# Patient Record
Sex: MALE | NOT HISPANIC OR LATINO | URBAN - METROPOLITAN AREA
[De-identification: names, ages, dates, MRNs, and addresses within clinical notes are randomized per-mention and may not be internally consistent; named-entity substitution may affect disease eponyms.]

---

## 2020-06-11 ENCOUNTER — OFFICE VISIT (OUTPATIENT)
Dept: URBAN - METROPOLITAN AREA TELEHEALTH 2 | Facility: TELEHEALTH | Age: 75
End: 2020-06-11

## 2020-06-22 ENCOUNTER — OFFICE VISIT (OUTPATIENT)
Dept: URBAN - METROPOLITAN AREA CLINIC 63 | Facility: CLINIC | Age: 75
End: 2020-06-22

## 2020-07-07 LAB
% SATURATION: (no result)
ABSOLUTE BASOPHILS: (no result)
ABSOLUTE EOSINOPHILS: (no result)
ABSOLUTE LYMPHOCYTES: (no result)
ABSOLUTE MONOCYTES: (no result)
ABSOLUTE NEUTROPHILS: (no result)
ALBUMIN/GLOBULIN RATIO: (no result)
ALBUMIN: (no result)
ALKALINE PHOSPHATASE: (no result)
ALT: (no result)
AST: (no result)
BASOPHILS: (no result)
BILIRUBIN, TOTAL: (no result)
BUN/CREATININE RATIO: (no result)
CALCIUM: (no result)
CARBON DIOXIDE: (no result)
CHLORIDE: (no result)
CREATININE: (no result)
EGFR AFRICAN AMERICA: (no result)
EGFR NON-AFR. AMERICAN: (no result)
EOSINOPHILS: (no result)
FERRITIN: (no result)
FOLATE, SERUM: (no result)
GLOBULIN: (no result)
GLUCOSE: (no result)
HEMATOCRIT: (no result)
HEMOGLOBIN: (no result)
IRON BINDING CAPACITY: (no result)
IRON, TOTAL: (no result)
LYMPHOCYTES: (no result)
MCH: (no result)
MCHC: (no result)
MCV: (no result)
MONOCYTES: (no result)
MPV: (no result)
NEUTROPHILS: (no result)
PLATELET COUNT: (no result)
POTASSIUM: (no result)
PROTEIN, TOTAL: (no result)
RDW: (no result)
RED BLOOD CELL COUNT: (no result)
SODIUM: (no result)
UREA NITROGEN (BUN): (no result)
VITAMIN B12: (no result)
WHITE BLOOD CELL COUNT: (no result)

## 2020-08-14 ENCOUNTER — OFFICE VISIT (OUTPATIENT)
Dept: URBAN - METROPOLITAN AREA SURGERY CENTER 4 | Facility: SURGERY CENTER | Age: 75
End: 2020-08-14

## 2020-08-25 ENCOUNTER — OFFICE VISIT (OUTPATIENT)
Dept: URBAN - METROPOLITAN AREA SURGERY CENTER 4 | Facility: SURGERY CENTER | Age: 75
End: 2020-08-25

## 2020-08-28 ENCOUNTER — OFFICE VISIT (OUTPATIENT)
Dept: URBAN - METROPOLITAN AREA CLINIC 63 | Facility: CLINIC | Age: 75
End: 2020-08-28

## 2020-10-02 ENCOUNTER — OFFICE VISIT (OUTPATIENT)
Dept: URBAN - METROPOLITAN AREA SURGERY CENTER 4 | Facility: SURGERY CENTER | Age: 75
End: 2020-10-02

## 2020-10-06 LAB — PATHOLOGY (INDENTED REPORT): (no result)

## 2022-03-30 ENCOUNTER — OFFICE VISIT (OUTPATIENT)
Dept: URBAN - METROPOLITAN AREA CLINIC 63 | Facility: CLINIC | Age: 77
End: 2022-03-30

## 2022-04-14 ENCOUNTER — OFFICE VISIT (OUTPATIENT)
Dept: URBAN - METROPOLITAN AREA CLINIC 63 | Facility: CLINIC | Age: 77
End: 2022-04-14

## 2022-05-16 ENCOUNTER — OFFICE VISIT (OUTPATIENT)
Dept: URBAN - METROPOLITAN AREA MEDICAL CENTER 3 | Facility: MEDICAL CENTER | Age: 77
End: 2022-05-16

## 2022-06-10 ENCOUNTER — OFFICE VISIT (OUTPATIENT)
Dept: URBAN - METROPOLITAN AREA SURGERY CENTER 4 | Facility: SURGERY CENTER | Age: 77
End: 2022-06-10

## 2022-07-09 ENCOUNTER — TELEPHONE ENCOUNTER (OUTPATIENT)
Dept: URBAN - METROPOLITAN AREA CLINIC 121 | Facility: CLINIC | Age: 77
End: 2022-07-09

## 2022-07-09 RX ORDER — SIMVASTATIN 20 MG/1
TABLET, FILM COATED ORAL
Refills: 0 | OUTPATIENT
Start: 2018-11-21 | End: 2019-07-03

## 2022-07-09 RX ORDER — CALCIUM ACETATE 667 MG
TABLET ORAL
Refills: 0 | OUTPATIENT
Start: 2022-03-21 | End: 2022-04-14

## 2022-07-09 RX ORDER — ONDANSETRON HYDROCHLORIDE 4 MG/2ML
1 EVERY 4 - 6 HOURS AS NEEDED INJECTION, SOLUTION INTRAMUSCULAR; INTRAVENOUS
Refills: 0 | OUTPATIENT
Start: 2019-03-07 | End: 2020-06-22

## 2022-07-09 RX ORDER — GLIPIZIDE 10 MG/1
TABLET ORAL TWICE A DAY
Refills: 0 | OUTPATIENT
Start: 2018-11-21 | End: 2019-07-03

## 2022-07-09 RX ORDER — SITAGLIPTIN PHOSPHATE 100 MG
TABLET ORAL
Refills: 0 | OUTPATIENT
Start: 2018-10-18 | End: 2019-07-03

## 2022-07-09 RX ORDER — SIMVASTATIN 20 MG/1
TABLET, FILM COATED ORAL
Refills: 0 | OUTPATIENT
Start: 2018-10-18 | End: 2018-11-21

## 2022-07-09 RX ORDER — METOPROLOL SUCCINATE 25 MG/1
TABLET, EXTENDED RELEASE ORAL TWICE A DAY
Refills: 0 | OUTPATIENT
Start: 2020-01-08 | End: 2022-03-30

## 2022-07-09 RX ORDER — LOSARTAN POTASSIUM 100 MG/1
TABLET, FILM COATED ORAL
Refills: 0 | OUTPATIENT
Start: 2019-07-03 | End: 2020-01-08

## 2022-07-09 RX ORDER — LOSARTAN POTASSIUM 100 MG/1
TABLET, FILM COATED ORAL
Refills: 0 | OUTPATIENT
Start: 2018-11-21 | End: 2019-07-03

## 2022-07-09 RX ORDER — METOPROLOL TARTRATE 100 MG/1
TABLET, FILM COATED ORAL
Refills: 0 | OUTPATIENT
Start: 2022-01-25 | End: 2022-04-14

## 2022-07-09 RX ORDER — GLIPIZIDE 10 MG/1
TABLET ORAL TWICE A DAY
Refills: 0 | OUTPATIENT
Start: 2018-09-13 | End: 2018-10-18

## 2022-07-09 RX ORDER — INSULIN DETEMIR 100 [IU]/ML
INJECTION, SOLUTION SUBCUTANEOUS
Refills: 0 | OUTPATIENT
Start: 2022-01-10 | End: 2022-04-14

## 2022-07-09 RX ORDER — METOPROLOL SUCCINATE 25 MG/1
TABLET, EXTENDED RELEASE ORAL TWICE A DAY
Refills: 0 | OUTPATIENT
Start: 2018-09-13 | End: 2018-10-18

## 2022-07-09 RX ORDER — OMEPRAZOLE 20 MG/1
CAPSULE, DELAYED RELEASE ORAL
Refills: 0 | OUTPATIENT
Start: 2022-01-30 | End: 2022-04-14

## 2022-07-09 RX ORDER — FAMOTIDINE 10 MG
TWICE A DAY TABLET ORAL TWICE A DAY
Refills: 1 | OUTPATIENT
Start: 2019-03-07 | End: 2020-06-22

## 2022-07-09 RX ORDER — SITAGLIPTIN PHOSPHATE 100 MG
TABLET ORAL
Refills: 0 | OUTPATIENT
Start: 2019-07-03 | End: 2020-01-08

## 2022-07-09 RX ORDER — METOPROLOL SUCCINATE 25 MG/1
TABLET, EXTENDED RELEASE ORAL TWICE A DAY
Refills: 0 | OUTPATIENT
Start: 2018-11-21 | End: 2019-07-03

## 2022-07-09 RX ORDER — OMEPRAZOLE 20 MG/1
ONCE A DAY; ONE CAPSULE 30MIN BEFORE BREAKFAST CAPSULE, DELAYED RELEASE ORAL ONCE A DAY
Refills: 1 | OUTPATIENT
Start: 2020-10-02 | End: 2021-04-29

## 2022-07-09 RX ORDER — GLIPIZIDE 10 MG/1
TABLET ORAL TWICE A DAY
Refills: 0 | OUTPATIENT
Start: 2019-07-03 | End: 2020-01-08

## 2022-07-09 RX ORDER — METOPROLOL SUCCINATE 25 MG/1
TABLET, EXTENDED RELEASE ORAL TWICE A DAY
Refills: 0 | OUTPATIENT
Start: 2019-07-03 | End: 2020-01-08

## 2022-07-09 RX ORDER — SIMVASTATIN 20 MG/1
TABLET, FILM COATED ORAL
Refills: 0 | OUTPATIENT
Start: 2018-09-13 | End: 2018-10-18

## 2022-07-09 RX ORDER — LOSARTAN POTASSIUM 100 MG/1
TABLET, FILM COATED ORAL
Refills: 0 | OUTPATIENT
Start: 2018-09-13 | End: 2018-10-18

## 2022-07-09 RX ORDER — PREDNISONE 10 MG/1
TABLET ORAL
Refills: 0 | OUTPATIENT
Start: 2022-02-21 | End: 2022-04-14

## 2022-07-09 RX ORDER — GLIPIZIDE 10 MG/1
TABLET ORAL TWICE A DAY
Refills: 0 | OUTPATIENT
Start: 2018-10-18 | End: 2018-11-21

## 2022-07-09 RX ORDER — SIMVASTATIN 20 MG/1
TABLET, FILM COATED ORAL
Refills: 0 | OUTPATIENT
Start: 2019-07-03 | End: 2020-01-08

## 2022-07-09 RX ORDER — ONDANSETRON HYDROCHLORIDE 4 MG/2ML
USE AS DIRECTED. TAKE 1 TABLET WITH START OF PREP AND 1 TABLET 4 HOURS LATER INJECTION, SOLUTION INTRAMUSCULAR; INTRAVENOUS
Refills: 0 | OUTPATIENT
Start: 2020-01-08 | End: 2020-06-22

## 2022-07-09 RX ORDER — CALCIUM ACETATE 667 MG/1
TABLET ORAL
Refills: 0 | OUTPATIENT
Start: 2021-09-09 | End: 2022-03-30

## 2022-07-09 RX ORDER — ONDANSETRON HYDROCHLORIDE 4 MG/2ML
1 EVERY 4 - 6 HOURS AS NEEDED; 1 PILL BEFORE PREP AND ONE 4 HRS LATER INJECTION, SOLUTION INTRAMUSCULAR; INTRAVENOUS
Refills: 0 | OUTPATIENT
Start: 2019-04-05 | End: 2020-06-22

## 2022-07-09 RX ORDER — LOSARTAN POTASSIUM 100 MG/1
TABLET, FILM COATED ORAL
Refills: 0 | OUTPATIENT
Start: 2018-10-18 | End: 2018-11-21

## 2022-07-09 RX ORDER — METOPROLOL SUCCINATE 25 MG/1
TABLET, EXTENDED RELEASE ORAL TWICE A DAY
Refills: 0 | OUTPATIENT
Start: 2018-10-18 | End: 2018-11-21

## 2022-07-09 RX ORDER — FAMOTIDINE 10 MG
TWICE A DAY TABLET ORAL TWICE A DAY
Refills: 1 | OUTPATIENT
Start: 2018-11-21 | End: 2020-06-22

## 2022-07-09 RX ORDER — SITAGLIPTIN PHOSPHATE 100 MG
TABLET ORAL
Refills: 0 | OUTPATIENT
Start: 2018-09-13 | End: 2018-10-18

## 2022-07-10 ENCOUNTER — TELEPHONE ENCOUNTER (OUTPATIENT)
Dept: URBAN - METROPOLITAN AREA CLINIC 121 | Facility: CLINIC | Age: 77
End: 2022-07-10

## 2022-07-10 RX ORDER — OMEPRAZOLE 20 MG/1
CAPSULE, DELAYED RELEASE ORAL ONCE A DAY
Refills: 0 | Status: ACTIVE | COMMUNITY
Start: 2022-04-14

## 2022-07-10 RX ORDER — LOSARTAN POTASSIUM 100 MG/1
TABLET, FILM COATED ORAL
Refills: 0 | Status: ACTIVE | COMMUNITY
Start: 2020-01-08

## 2022-07-10 RX ORDER — SIMVASTATIN 20 MG/1
TABLET, FILM COATED ORAL
Refills: 0 | Status: ACTIVE | COMMUNITY
Start: 2020-01-08

## 2022-07-10 RX ORDER — CALCIUM ACETATE 667 MG
TABLET ORAL ONCE A DAY
Refills: 0 | Status: ACTIVE | COMMUNITY
Start: 2022-04-14

## 2022-07-10 RX ORDER — PREDNISONE 10 MG/1
TABLET ORAL ONCE A DAY
Refills: 0 | Status: ACTIVE | COMMUNITY
Start: 2022-04-14

## 2022-07-10 RX ORDER — OMEPRAZOLE 20 MG/1
TAKE 1 CAPSULE BY MOUTH ONCE DAILY 30  MINUTES  BEFORE  BREAKFAST CAPSULE, DELAYED RELEASE ORAL
Refills: 0 | Status: ACTIVE | COMMUNITY
Start: 2021-04-29

## 2022-07-10 RX ORDER — METOPROLOL TARTRATE 100 MG/1
TABLET, FILM COATED ORAL ONCE A DAY
Refills: 0 | Status: ACTIVE | COMMUNITY
Start: 2022-04-14

## 2022-07-10 RX ORDER — POLYETHYLENE GLYCOL-3350 AND ELECTROLYTES WITH FLAVOR PACK 240; 5.84; 2.98; 6.72; 22.72 G/278.26G; G/278.26G; G/278.26G; G/278.26G; G/278.26G
TAKE AS DIRECTED POWDER, FOR SOLUTION ORAL TAKE AS DIRECTED
Refills: 0 | Status: ACTIVE | COMMUNITY
Start: 2022-04-14

## 2022-07-10 RX ORDER — INSULIN DETEMIR 100 [IU]/ML
INJECTION, SOLUTION SUBCUTANEOUS ONCE A DAY
Refills: 0 | Status: ACTIVE | COMMUNITY
Start: 2022-04-14

## 2022-07-10 RX ORDER — SIMVASTATIN 20 MG/1
TABLET, FILM COATED ORAL ONCE A DAY
Refills: 0 | Status: ACTIVE | COMMUNITY
Start: 2022-04-14

## 2022-07-10 RX ORDER — SITAGLIPTIN PHOSPHATE 100 MG
TABLET ORAL
Refills: 0 | Status: ACTIVE | COMMUNITY
Start: 2020-01-08

## 2022-07-10 RX ORDER — GLIPIZIDE 10 MG/1
TABLET ORAL TWICE A DAY
Refills: 0 | Status: ACTIVE | COMMUNITY
Start: 2020-01-08

## 2022-07-11 ENCOUNTER — OFFICE VISIT (OUTPATIENT)
Dept: URBAN - METROPOLITAN AREA MEDICAL CENTER 3 | Facility: MEDICAL CENTER | Age: 77
End: 2022-07-11

## 2022-08-04 ENCOUNTER — TELEPHONE ENCOUNTER (OUTPATIENT)
Dept: URBAN - METROPOLITAN AREA CLINIC 63 | Facility: CLINIC | Age: 77
End: 2022-08-04

## 2022-08-08 ENCOUNTER — OFFICE VISIT (OUTPATIENT)
Dept: URBAN - METROPOLITAN AREA MEDICAL CENTER 3 | Facility: MEDICAL CENTER | Age: 77
End: 2022-08-08

## 2023-02-02 ENCOUNTER — LAB OUTSIDE AN ENCOUNTER (OUTPATIENT)
Dept: URBAN - METROPOLITAN AREA CLINIC 63 | Facility: CLINIC | Age: 78
End: 2023-02-02

## 2023-02-02 ENCOUNTER — WEB ENCOUNTER (OUTPATIENT)
Dept: URBAN - METROPOLITAN AREA CLINIC 63 | Facility: CLINIC | Age: 78
End: 2023-02-02

## 2023-02-02 ENCOUNTER — OFFICE VISIT (OUTPATIENT)
Dept: URBAN - METROPOLITAN AREA CLINIC 63 | Facility: CLINIC | Age: 78
End: 2023-02-02
Payer: COMMERCIAL

## 2023-02-02 VITALS
SYSTOLIC BLOOD PRESSURE: 140 MMHG | WEIGHT: 143.2 LBS | BODY MASS INDEX: 23.01 KG/M2 | OXYGEN SATURATION: 98 % | TEMPERATURE: 97.6 F | DIASTOLIC BLOOD PRESSURE: 62 MMHG | HEART RATE: 112 BPM | HEIGHT: 66 IN

## 2023-02-02 DIAGNOSIS — R79.89 ABNORMAL LFTS: ICD-10-CM

## 2023-02-02 DIAGNOSIS — Z85.038 PERSONAL HISTORY OF COLON CANCER: ICD-10-CM

## 2023-02-02 DIAGNOSIS — R13.19 ESOPHAGEAL DYSPHAGIA: ICD-10-CM

## 2023-02-02 PROBLEM — 429699009: Status: ACTIVE | Noted: 2023-01-31

## 2023-02-02 PROCEDURE — 99214 OFFICE O/P EST MOD 30 MIN: CPT | Performed by: INTERNAL MEDICINE

## 2023-02-02 RX ORDER — ONDANSETRON HYDROCHLORIDE 4 MG/1
1 TABLET TABLET, FILM COATED ORAL
Qty: 2 | Refills: 0 | OUTPATIENT
Start: 2023-02-02

## 2023-02-02 RX ORDER — PREDNISONE 10 MG/1
TABLET ORAL ONCE A DAY
Refills: 0 | Status: ACTIVE | COMMUNITY
Start: 2022-04-14

## 2023-02-02 RX ORDER — POLYETHYLENE GLYCOL 3350, SODIUM CHLORIDE, SODIUM BICARBONATE, POTASSIUM CHLORIDE 420; 11.2; 5.72; 1.48 G/4L; G/4L; G/4L; G/4L
AS DIRECTED POWDER, FOR SOLUTION ORAL ONCE
Qty: 4000 | Refills: 0 | OUTPATIENT
Start: 2023-02-02 | End: 2023-02-03

## 2023-02-02 RX ORDER — INSULIN DETEMIR 100 [IU]/ML
INJECTION, SOLUTION SUBCUTANEOUS ONCE A DAY
Refills: 0 | Status: ACTIVE | COMMUNITY
Start: 2022-04-14

## 2023-02-02 RX ORDER — GLIPIZIDE 10 MG/1
TABLET ORAL TWICE A DAY
Refills: 0 | Status: ACTIVE | COMMUNITY
Start: 2020-01-08

## 2023-02-02 RX ORDER — SITAGLIPTIN PHOSPHATE 100 MG
TABLET ORAL
Refills: 0 | Status: ACTIVE | COMMUNITY
Start: 2020-01-08

## 2023-02-02 RX ORDER — METOPROLOL TARTRATE 100 MG/1
TABLET, FILM COATED ORAL ONCE A DAY
Refills: 0 | Status: ACTIVE | COMMUNITY
Start: 2022-04-14

## 2023-02-02 RX ORDER — SIMVASTATIN 20 MG/1
TABLET, FILM COATED ORAL
Refills: 0 | Status: ACTIVE | COMMUNITY
Start: 2020-01-08

## 2023-02-02 NOTE — HPI-TODAY'S VISIT:
Gildardo is a pleasant 77-year-old male who presents today for follow-up. Personal history of colon cancer.  History of extended right hemicolectomy in May 2019 with Dr. Senior. Presents today for clearance for upcoming kidney transplant. Patient has had multiple hospital admissions since his previous visit.  Multiple problems including abdominal pain, constipation, pleural effusions, aspiration pneumonitis, CHF exacerbation, toxic metabolic encephalopathy, hypoglycemia.  Most recently discharged from the hospital 1/21/2023 after presenting with hyperkalemia.  Underwent angioplasty of his AV graft on 1/20/2023.  Patient following with vascular surgery, nephrology, pulmonology and cardiology.  Labs dated 1/20/2023 showed WBC 2.7, RBC 3.5.  Normal hemoglobin.  Hematocrit 38.6, .3.  Platelets 144.  PT/INR normal.  Sodium 130, potassium 5.7, glucose 363, creatinine 7.59, GFR 6.8.  Alk phos 219 but otherwise normal LFTs.  Hep B surface antibody positive.  Hep C negative.  CT abdomen/pelvis without contrast in April 2019 showed no evidence for metastatic disease.  Small to moderate free-flowing bilateral pleural effusions.  Minimal right inguinal hernia containing fat  Ultrasound dated 3/14/2019 demonstrated normal ultrasound appearance of the liver.  There was mild diffuse increased renal cortical echogenicity suggestive for renal parenchymal disease.  Barium swallow dated 7/2/2020 showed no stricture or obstruction.  No delay in passage of 13 mm solid barium pill.  Moderately severe, generalized esophageal dysmotility.  Nonspecific.  Dino aspiration was seen on the patient's 2008 barium swallow.  Today there may have been trace glottic penetration but no dino aspiration  EGD dated 10/2/2020 demonstrated a regular Z line.  Dilation was performed with a Nicolas dilator with no resistance at 50 [place].  Luminal narrowing in the mid esophagus.  Gastritis.  Erosive gastropathy.  Duodenitis.  Consider CT of the chest as outpatient. Benign duodenal mucosa.  Negative for fungal organisms, parasites or Whipple's disease.  Mild chronic inflammation negative for H. pylori.  Benign lower third esophageal biopsies  Colonoscopy dated 3/31/2020 demonstrated nonbleeding internal hemorrhoids and no specimens were collected.  Repeat colonoscopy in 3 years per protocol.  Patient presents with c/o dysphagia and surveillance colonoscopy.  Denies constipation, rectal bleeding, diarrhea, abd pain.  Lost approximately 20lbs in 4mo during time  of hospitalization. PAtient states currently without exertional dyspnea and denies chest pain

## 2023-02-09 ENCOUNTER — LAB OUTSIDE AN ENCOUNTER (OUTPATIENT)
Dept: URBAN - METROPOLITAN AREA CLINIC 63 | Facility: CLINIC | Age: 78
End: 2023-02-09

## 2023-04-24 ENCOUNTER — TELEPHONE ENCOUNTER (OUTPATIENT)
Dept: URBAN - METROPOLITAN AREA CLINIC 63 | Facility: CLINIC | Age: 78
End: 2023-04-24

## 2023-05-03 ENCOUNTER — OFFICE VISIT (OUTPATIENT)
Dept: URBAN - METROPOLITAN AREA MEDICAL CENTER 14 | Facility: MEDICAL CENTER | Age: 78
End: 2023-05-03

## 2023-05-24 ENCOUNTER — OFFICE VISIT (OUTPATIENT)
Dept: URBAN - METROPOLITAN AREA CLINIC 63 | Facility: CLINIC | Age: 78
End: 2023-05-24

## 2023-07-19 ENCOUNTER — LAB OUTSIDE AN ENCOUNTER (OUTPATIENT)
Dept: URBAN - METROPOLITAN AREA CLINIC 63 | Facility: CLINIC | Age: 78
End: 2023-07-19

## 2023-07-19 ENCOUNTER — OFFICE VISIT (OUTPATIENT)
Dept: URBAN - METROPOLITAN AREA CLINIC 63 | Facility: CLINIC | Age: 78
End: 2023-07-19
Payer: COMMERCIAL

## 2023-07-19 ENCOUNTER — DASHBOARD ENCOUNTERS (OUTPATIENT)
Age: 78
End: 2023-07-19

## 2023-07-19 VITALS
SYSTOLIC BLOOD PRESSURE: 134 MMHG | BODY MASS INDEX: 23.3 KG/M2 | TEMPERATURE: 97.5 F | WEIGHT: 145 LBS | DIASTOLIC BLOOD PRESSURE: 72 MMHG | HEIGHT: 66 IN

## 2023-07-19 DIAGNOSIS — R13.19 ESOPHAGEAL DYSPHAGIA: ICD-10-CM

## 2023-07-19 DIAGNOSIS — Z85.038 PERSONAL HISTORY OF COLON CANCER: ICD-10-CM

## 2023-07-19 DIAGNOSIS — R79.89 ABNORMAL LFTS: ICD-10-CM

## 2023-07-19 PROCEDURE — 99214 OFFICE O/P EST MOD 30 MIN: CPT | Performed by: INTERNAL MEDICINE

## 2023-07-19 RX ORDER — POLYETHYLENE GLYCOL 3350, SODIUM CHLORIDE, SODIUM BICARBONATE, POTASSIUM CHLORIDE 420; 11.2; 5.72; 1.48 G/4L; G/4L; G/4L; G/4L
AS DIRECTED POWDER, FOR SOLUTION ORAL ONCE
Qty: 4000 | Refills: 0 | OUTPATIENT
Start: 2023-07-19 | End: 2023-07-20

## 2023-07-19 RX ORDER — GLIPIZIDE 10 MG/1
TABLET ORAL TWICE A DAY
Refills: 0 | Status: ACTIVE | COMMUNITY
Start: 2020-01-08

## 2023-07-19 RX ORDER — METOPROLOL TARTRATE 100 MG/1
TABLET, FILM COATED ORAL ONCE A DAY
Refills: 0 | Status: ACTIVE | COMMUNITY
Start: 2022-04-14

## 2023-07-19 RX ORDER — INSULIN DETEMIR 100 [IU]/ML
INJECTION, SOLUTION SUBCUTANEOUS ONCE A DAY
Refills: 0 | Status: ACTIVE | COMMUNITY
Start: 2022-04-14

## 2023-07-19 RX ORDER — SITAGLIPTIN PHOSPHATE 100 MG
TABLET ORAL
Refills: 0 | Status: ACTIVE | COMMUNITY
Start: 2020-01-08

## 2023-07-19 RX ORDER — ONDANSETRON HYDROCHLORIDE 4 MG/1
1 TABLET TABLET, FILM COATED ORAL
Qty: 2 | Refills: 0 | OUTPATIENT
Start: 2023-07-19

## 2023-07-19 RX ORDER — SIMVASTATIN 20 MG/1
TABLET, FILM COATED ORAL
Refills: 0 | Status: ACTIVE | COMMUNITY
Start: 2020-01-08

## 2023-07-19 RX ORDER — PREDNISONE 10 MG/1
TABLET ORAL ONCE A DAY
Refills: 0 | Status: ACTIVE | COMMUNITY
Start: 2022-04-14

## 2023-07-19 NOTE — HPI-TODAY'S VISIT:
Gildardo is a pleasant 77-year-old male who presents today for a follow-up. Personal history of colon cancer.  History of extended right hemicolectomy in May 2019 with Dr. Senior. In need of clearance for upcoming kidney transplant. Last visit noted dysphagia.  20 pound weight loss over a 4-month period during hospitalizations.  Denied exertional dyspnea or chest pain.  Barium swallow ordered last visit not completed.  Labs ordered last visit not completed.  Advised importance of avoiding dry foods and to cut food into small pieces  Labs dated 1/20/2023 showed WBC 2.7, RBC 3.5.  Normal hemoglobin.  Hematocrit 38.6, .3.  Platelets 144.  PT/INR normal.  Sodium 130, potassium 5.7, glucose 363, creatinine 7.59, GFR 6.8.  Alk phos 219 but otherwise normal LFTs.  Hep B surface antibody positive.  Hep C negative.  CT abdomen/pelvis without contrast in April 2019 showed no evidence for metastatic disease.  Small to moderate free-flowing bilateral pleural effusions.  Minimal right inguinal hernia containing fat  Ultrasound dated 3/14/2019 demonstrated normal ultrasound appearance of the liver.  There was mild diffuse increased renal cortical echogenicity suggestive for renal parenchymal disease.  Barium swallow dated 7/2/2020 showed no stricture or obstruction.  No delay in passage of 13 mm solid barium pill.  Moderately severe, generalized esophageal dysmotility.  Nonspecific.  Dino aspiration was seen on the patient's 2008 barium swallow.  Today there may have been trace glottic penetration but no dino aspiration  EGD dated 10/2/2020 demonstrated a regular Z line.  Dilation was performed with a Nicolas dilator with no resistance at 50 [place].  Luminal narrowing in the mid esophagus.  Gastritis.  Erosive gastropathy.  Duodenitis.  Consider CT of the chest as outpatient. Benign duodenal mucosa.  Negative for fungal organisms, parasites or Whipple's disease.  Mild chronic inflammation negative for H. pylori.  Benign lower third esophageal biopsies  Colonoscopy dated 3/31/2020 demonstrated nonbleeding internal hemorrhoids and no specimens were collected.  Repeat colonoscopy in 3 years per protocol.  Patient states doing well denies any rectal bleeding, constipation, dysphagia, abd pain. wt losshearburn, melena or wt loss.  Patient unclear of name of antiplatelet agent or anticoagulant he is on

## 2023-07-26 ENCOUNTER — LAB OUTSIDE AN ENCOUNTER (OUTPATIENT)
Dept: URBAN - METROPOLITAN AREA CLINIC 63 | Facility: CLINIC | Age: 78
End: 2023-07-26

## 2023-07-31 ENCOUNTER — TELEPHONE ENCOUNTER (OUTPATIENT)
Dept: URBAN - METROPOLITAN AREA CLINIC 63 | Facility: CLINIC | Age: 78
End: 2023-07-31

## 2023-08-02 ENCOUNTER — OFFICE VISIT (OUTPATIENT)
Dept: URBAN - METROPOLITAN AREA MEDICAL CENTER 14 | Facility: MEDICAL CENTER | Age: 78
End: 2023-08-02
Payer: COMMERCIAL

## 2023-08-02 DIAGNOSIS — Z85.038 PERSONAL HISTORY OF COLON CANCER: ICD-10-CM

## 2023-08-02 PROCEDURE — G0105 COLORECTAL SCRN; HI RISK IND: HCPCS | Performed by: INTERNAL MEDICINE

## 2023-08-02 RX ORDER — SITAGLIPTIN PHOSPHATE 100 MG
TABLET ORAL
Refills: 0 | Status: ACTIVE | COMMUNITY
Start: 2020-01-08

## 2023-08-02 RX ORDER — INSULIN DETEMIR 100 [IU]/ML
INJECTION, SOLUTION SUBCUTANEOUS ONCE A DAY
Refills: 0 | Status: ACTIVE | COMMUNITY
Start: 2022-04-14

## 2023-08-02 RX ORDER — GLIPIZIDE 10 MG/1
TABLET ORAL TWICE A DAY
Refills: 0 | Status: ACTIVE | COMMUNITY
Start: 2020-01-08

## 2023-08-02 RX ORDER — SIMVASTATIN 20 MG/1
TABLET, FILM COATED ORAL
Refills: 0 | Status: ACTIVE | COMMUNITY
Start: 2020-01-08

## 2023-08-02 RX ORDER — ONDANSETRON HYDROCHLORIDE 4 MG/1
1 TABLET TABLET, FILM COATED ORAL
Qty: 2 | Refills: 0 | Status: ACTIVE | COMMUNITY
Start: 2023-07-19

## 2023-08-02 RX ORDER — PREDNISONE 10 MG/1
TABLET ORAL ONCE A DAY
Refills: 0 | Status: ACTIVE | COMMUNITY
Start: 2022-04-14

## 2023-08-02 RX ORDER — METOPROLOL TARTRATE 100 MG/1
TABLET, FILM COATED ORAL ONCE A DAY
Refills: 0 | Status: ACTIVE | COMMUNITY
Start: 2022-04-14

## 2023-08-03 ENCOUNTER — TELEPHONE ENCOUNTER (OUTPATIENT)
Dept: URBAN - METROPOLITAN AREA CLINIC 63 | Facility: CLINIC | Age: 78
End: 2023-08-03

## 2023-08-03 RX ORDER — ONDANSETRON HYDROCHLORIDE 4 MG/1
1 TABLET TABLET, FILM COATED ORAL
Qty: 2 | Refills: 0 | OUTPATIENT
Start: 2023-08-03

## 2023-08-03 RX ORDER — POLYETHYLENE GLYCOL 3350, SODIUM CHLORIDE, SODIUM BICARBONATE, POTASSIUM CHLORIDE 420; 11.2; 5.72; 1.48 G/4L; G/4L; G/4L; G/4L
AS DIRECTED POWDER, FOR SOLUTION ORAL ONCE
Qty: 4000 | Refills: 0 | OUTPATIENT
Start: 2023-08-03 | End: 2023-08-04

## 2023-08-10 ENCOUNTER — LAB OUTSIDE AN ENCOUNTER (OUTPATIENT)
Dept: URBAN - METROPOLITAN AREA CLINIC 63 | Facility: CLINIC | Age: 78
End: 2023-08-10

## 2023-08-14 ENCOUNTER — TELEPHONE ENCOUNTER (OUTPATIENT)
Dept: URBAN - METROPOLITAN AREA CLINIC 60 | Facility: CLINIC | Age: 78
End: 2023-08-14

## 2023-08-18 ENCOUNTER — APPOINTMENT (RX ONLY)
Dept: URBAN - METROPOLITAN AREA CLINIC 335 | Facility: CLINIC | Age: 78
Setting detail: DERMATOLOGY
End: 2023-08-18

## 2023-08-18 DIAGNOSIS — L82.0 INFLAMED SEBORRHEIC KERATOSIS: ICD-10-CM | Status: INADEQUATELY CONTROLLED

## 2023-08-18 DIAGNOSIS — L21.8 OTHER SEBORRHEIC DERMATITIS: ICD-10-CM | Status: INADEQUATELY CONTROLLED

## 2023-08-18 PROBLEM — D48.5 NEOPLASM OF UNCERTAIN BEHAVIOR OF SKIN: Status: ACTIVE | Noted: 2023-08-18

## 2023-08-18 PROCEDURE — ? BIOPSY BY SHAVE METHOD

## 2023-08-18 PROCEDURE — ? PRESCRIPTION

## 2023-08-18 PROCEDURE — 99204 OFFICE O/P NEW MOD 45 MIN: CPT | Mod: 25

## 2023-08-18 PROCEDURE — 11102 TANGNTL BX SKIN SINGLE LES: CPT

## 2023-08-18 PROCEDURE — ? RECOMMENDATIONS

## 2023-08-18 PROCEDURE — ? COUNSELING

## 2023-08-18 RX ORDER — CETIRIZINE HYDROCHLORIDE 10 MG/1
TABLET, FILM COATED ORAL
Qty: 30 | Refills: 3 | Status: ERX | COMMUNITY
Start: 2023-08-18

## 2023-08-18 RX ORDER — KETOCONAZOLE 20 MG/G
CREAM TOPICAL BID
Qty: 30 | Refills: 1 | Status: ERX | COMMUNITY
Start: 2023-08-18

## 2023-08-18 RX ORDER — HYDROCORTISONE 25 MG/G
CREAM TOPICAL
Qty: 30 | Refills: 1 | Status: ERX | COMMUNITY
Start: 2023-08-18

## 2023-08-18 RX ADMIN — KETOCONAZOLE: 20 CREAM TOPICAL at 00:00

## 2023-08-18 RX ADMIN — CETIRIZINE HYDROCHLORIDE: 10 TABLET, FILM COATED ORAL at 00:00

## 2023-08-18 RX ADMIN — HYDROCORTISONE: 25 CREAM TOPICAL at 00:00

## 2023-08-18 ASSESSMENT — LOCATION DETAILED DESCRIPTION DERM
LOCATION DETAILED: RIGHT INFERIOR CENTRAL MALAR CHEEK
LOCATION DETAILED: LEFT DISTAL DORSAL FOREARM
LOCATION DETAILED: RIGHT INFERIOR CENTRAL MALAR CHEEK

## 2023-08-18 ASSESSMENT — LOCATION ZONE DERM
LOCATION ZONE: FACE
LOCATION ZONE: ARM
LOCATION ZONE: FACE

## 2023-08-18 ASSESSMENT — LOCATION SIMPLE DESCRIPTION DERM
LOCATION SIMPLE: RIGHT CHEEK
LOCATION SIMPLE: LEFT FOREARM
LOCATION SIMPLE: RIGHT CHEEK

## 2023-08-18 NOTE — PROCEDURE: RECOMMENDATIONS
Recommendation Preamble: The following recommendations were made during the visit: Ceravee moisterizer and cleanser
Render Risk Assessment In Note?: no
Detail Level: Zone

## 2023-08-21 ENCOUNTER — TELEPHONE ENCOUNTER (OUTPATIENT)
Dept: URBAN - METROPOLITAN AREA CLINIC 63 | Facility: CLINIC | Age: 78
End: 2023-08-21

## 2023-08-21 RX ORDER — POLYETHYLENE GLYCOL 3350, SODIUM CHLORIDE, SODIUM BICARBONATE, POTASSIUM CHLORIDE 420; 11.2; 5.72; 1.48 G/4L; G/4L; G/4L; G/4L
AS DIRECTED POWDER, FOR SOLUTION ORAL ONCE
Qty: 4000 | Refills: 0
Start: 2023-08-03 | End: 2023-08-22

## 2023-08-30 ENCOUNTER — OFFICE VISIT (OUTPATIENT)
Dept: URBAN - METROPOLITAN AREA CLINIC 63 | Facility: CLINIC | Age: 78
End: 2023-08-30

## 2023-09-06 ENCOUNTER — OFFICE VISIT (OUTPATIENT)
Dept: URBAN - METROPOLITAN AREA MEDICAL CENTER 14 | Facility: MEDICAL CENTER | Age: 78
End: 2023-09-06
Payer: COMMERCIAL

## 2023-09-06 DIAGNOSIS — Z85.038 PERSONAL HISTORY OF COLON CANCER: ICD-10-CM

## 2023-09-06 DIAGNOSIS — K64.0 GRADE I HEMORRHOIDS: ICD-10-CM

## 2023-09-06 PROCEDURE — G0105 COLORECTAL SCRN; HI RISK IND: HCPCS | Performed by: INTERNAL MEDICINE

## 2023-09-06 RX ORDER — ONDANSETRON HYDROCHLORIDE 4 MG/1
1 TABLET TABLET, FILM COATED ORAL
Qty: 2 | Refills: 0 | Status: ACTIVE | COMMUNITY
Start: 2023-08-03

## 2023-09-06 RX ORDER — PREDNISONE 10 MG/1
TABLET ORAL ONCE A DAY
Refills: 0 | Status: ACTIVE | COMMUNITY
Start: 2022-04-14

## 2023-09-06 RX ORDER — GLIPIZIDE 10 MG/1
TABLET ORAL TWICE A DAY
Refills: 0 | Status: ACTIVE | COMMUNITY
Start: 2020-01-08

## 2023-09-06 RX ORDER — ONDANSETRON HYDROCHLORIDE 4 MG/1
1 TABLET TABLET, FILM COATED ORAL
Qty: 2 | Refills: 0 | Status: ACTIVE | COMMUNITY
Start: 2023-07-19

## 2023-09-06 RX ORDER — INSULIN DETEMIR 100 [IU]/ML
INJECTION, SOLUTION SUBCUTANEOUS ONCE A DAY
Refills: 0 | Status: ACTIVE | COMMUNITY
Start: 2022-04-14

## 2023-09-06 RX ORDER — SIMVASTATIN 20 MG/1
TABLET, FILM COATED ORAL
Refills: 0 | Status: ACTIVE | COMMUNITY
Start: 2020-01-08

## 2023-09-06 RX ORDER — METOPROLOL TARTRATE 100 MG/1
TABLET, FILM COATED ORAL ONCE A DAY
Refills: 0 | Status: ACTIVE | COMMUNITY
Start: 2022-04-14

## 2023-09-06 RX ORDER — SITAGLIPTIN PHOSPHATE 100 MG
TABLET ORAL
Refills: 0 | Status: ACTIVE | COMMUNITY
Start: 2020-01-08

## 2023-09-27 ENCOUNTER — OFFICE VISIT (OUTPATIENT)
Dept: URBAN - METROPOLITAN AREA CLINIC 63 | Facility: CLINIC | Age: 78
End: 2023-09-27

## 2023-09-27 NOTE — HPI-TODAY'S VISIT:
Gildardo is a pleasant 77-year-old male who presents today for a procedure follow-up. Personal history of colon cancer.  History of extended right hemicolectomy in May 2019 with Dr. Senior. In need of clearance for upcoming kidney transplant. Labs ordered last visit not completed. Patient discharged from the hospital on 9/22/2023 after syncopal episode at dialysis center.  Neurology recommended continuation of aspirin and statin.  Syncopal episode felt to be related to hypovolemia postdialysis.  Labs dated 9/21/2023 showed RBC 2.94, hemoglobin 10.2, hematocrit 29.7, , platelets 120.  PT/INR normal.  Sodium 133.  Creatinine 4.75, GFR 12.  Normal LFTs.  TSH normal.  Vitamin B12 unremarkable.  Colonoscopy performed 8/2/2023 with no obvious polyps seen.  Suboptimal prep.  Will need colonoscopy in 2 to 3 months  Colonoscopy dated 9/6/2023 showed no obvious polyps seen with extensive lavage.  Performed.  Mild internal hemorrhoids.  Repeat colonoscopy in 1 year.  Patient is cleared for renal transplant  Ultrasound dated 3/14/2019 demonstrated normal ultrasound appearance of the liver.  There was mild diffuse increased renal cortical echogenicity suggestive for renal parenchymal disease.  Barium swallow dated 7/2/2020 showed no stricture or obstruction.  No delay in passage of 13 mm solid barium pill.  Moderately severe, generalized esophageal dysmotility.  Nonspecific.  Dino aspiration was seen on the patient's 2008 barium swallow.  Today there may have been trace glottic penetration but no dino aspiration  EGD dated 10/2/2020 demonstrated a regular Z line.  Dilation was performed with a Nicolas dilator with no resistance at 50 [place].  Luminal narrowing in the mid esophagus.  Gastritis.  Erosive gastropathy.  Duodenitis.  Consider CT of the chest as outpatient. Benign duodenal mucosa.  Negative for fungal organisms, parasites or Whipple's disease.  Mild chronic inflammation negative for H. pylori.  Benign lower third esophageal biopsies

## 2024-08-12 ENCOUNTER — APPOINTMENT (RX ONLY)
Dept: URBAN - METROPOLITAN AREA CLINIC 335 | Facility: CLINIC | Age: 79
Setting detail: DERMATOLOGY
End: 2024-08-12

## 2024-08-12 DIAGNOSIS — L21.8 OTHER SEBORRHEIC DERMATITIS: ICD-10-CM | Status: INADEQUATELY CONTROLLED

## 2024-08-12 PROCEDURE — ? COUNSELING

## 2024-08-12 PROCEDURE — ? PRESCRIPTION

## 2024-08-12 PROCEDURE — 99214 OFFICE O/P EST MOD 30 MIN: CPT

## 2024-08-12 RX ORDER — FLUOCINOLONE ACETONIDE 0.11 MG/ML
OIL TOPICAL QD
Qty: 118.28 | Refills: 1 | Status: ERX | COMMUNITY
Start: 2024-08-12

## 2024-08-12 RX ORDER — KETOCONAZOLE 20 MG/ML
SHAMPOO, SUSPENSION TOPICAL
Qty: 120 | Refills: 2 | Status: ERX | COMMUNITY
Start: 2024-08-12

## 2024-08-12 RX ORDER — KETOCONAZOLE 20 MG/G
CREAM TOPICAL BID
Qty: 30 | Refills: 1 | Status: ERX

## 2024-08-12 RX ADMIN — FLUOCINOLONE ACETONIDE: 0.11 OIL TOPICAL at 00:00

## 2024-08-12 RX ADMIN — KETOCONAZOLE: 20 SHAMPOO, SUSPENSION TOPICAL at 00:00

## 2024-08-12 ASSESSMENT — LOCATION SIMPLE DESCRIPTION DERM
LOCATION SIMPLE: LEFT CHEEK
LOCATION SIMPLE: INFERIOR FOREHEAD
LOCATION SIMPLE: SCALP

## 2024-08-12 ASSESSMENT — LOCATION ZONE DERM
LOCATION ZONE: SCALP
LOCATION ZONE: FACE

## 2024-08-12 ASSESSMENT — LOCATION DETAILED DESCRIPTION DERM
LOCATION DETAILED: LEFT MEDIAL MALAR CHEEK
LOCATION DETAILED: LEFT SUPERIOR PARIETAL SCALP
LOCATION DETAILED: INFERIOR MID FOREHEAD

## 2024-10-30 ENCOUNTER — RX ONLY (OUTPATIENT)
Age: 79
Setting detail: RX ONLY
End: 2024-10-30

## 2024-10-30 ENCOUNTER — APPOINTMENT (RX ONLY)
Dept: URBAN - METROPOLITAN AREA CLINIC 335 | Facility: CLINIC | Age: 79
Setting detail: DERMATOLOGY
End: 2024-10-30

## 2024-10-30 DIAGNOSIS — L21.8 OTHER SEBORRHEIC DERMATITIS: ICD-10-CM | Status: IMPROVED

## 2024-10-30 DIAGNOSIS — L65.9 NONSCARRING HAIR LOSS, UNSPECIFIED: ICD-10-CM | Status: INADEQUATELY CONTROLLED

## 2024-10-30 PROCEDURE — 99214 OFFICE O/P EST MOD 30 MIN: CPT

## 2024-10-30 PROCEDURE — ? COUNSELING

## 2024-10-30 PROCEDURE — ? PRESCRIPTION MEDICATION MANAGEMENT

## 2024-10-30 RX ORDER — KETOCONAZOLE 20 MG/ML
SHAMPOO, SUSPENSION TOPICAL
Qty: 120 | Refills: 2 | Status: ERX

## 2024-10-30 RX ORDER — FLUOCINOLONE ACETONIDE 0.11 MG/ML
OIL TOPICAL QD
Qty: 118.28 | Refills: 1 | Status: ERX

## 2024-10-30 RX ORDER — KETOCONAZOLE 20 MG/G
CREAM TOPICAL BID
Qty: 30 | Refills: 1 | Status: ERX

## 2024-10-30 ASSESSMENT — LOCATION DETAILED DESCRIPTION DERM
LOCATION DETAILED: LEFT SUPERIOR PARIETAL SCALP
LOCATION DETAILED: LEFT MEDIAL MALAR CHEEK
LOCATION DETAILED: SUPERIOR MID FOREHEAD
LOCATION DETAILED: INFERIOR MID FOREHEAD

## 2024-10-30 ASSESSMENT — LOCATION SIMPLE DESCRIPTION DERM
LOCATION SIMPLE: SUPERIOR FOREHEAD
LOCATION SIMPLE: INFERIOR FOREHEAD
LOCATION SIMPLE: SCALP
LOCATION SIMPLE: LEFT CHEEK

## 2024-10-30 ASSESSMENT — LOCATION ZONE DERM
LOCATION ZONE: FACE
LOCATION ZONE: SCALP

## 2024-10-30 NOTE — PROCEDURE: PRESCRIPTION MEDICATION MANAGEMENT
Continue Regimen: ketoconazole 2 % shampoo : Wash scalp and face three times a week . Leave on scalp 3-5min than wash off\\n\\nDerma-Smoothe/FS Scalp Oil 0.01 % : Apply a small amount to the scalp once a day 3 times a week\\n\\nketoconazole 2 % topical cream : Apply once a day to the affected areas on the face
Detail Level: Zone
Render In Strict Bullet Format?: No
Initiate Treatment: Minoxidil or Rogaine 5% twice a day on scalp

## 2024-11-13 ENCOUNTER — TELEPHONE ENCOUNTER (OUTPATIENT)
Dept: URBAN - METROPOLITAN AREA CLINIC 23 | Facility: CLINIC | Age: 79
End: 2024-11-13

## 2025-02-06 ENCOUNTER — TELEPHONE ENCOUNTER (OUTPATIENT)
Dept: URBAN - METROPOLITAN AREA CLINIC 63 | Facility: CLINIC | Age: 80
End: 2025-02-06

## 2025-02-12 ENCOUNTER — OFFICE VISIT (OUTPATIENT)
Dept: URBAN - METROPOLITAN AREA CLINIC 63 | Facility: CLINIC | Age: 80
End: 2025-02-12
Payer: COMMERCIAL

## 2025-02-12 VITALS
OXYGEN SATURATION: 94 % | WEIGHT: 167 LBS | BODY MASS INDEX: 26.84 KG/M2 | DIASTOLIC BLOOD PRESSURE: 80 MMHG | TEMPERATURE: 98.2 F | HEART RATE: 91 BPM | SYSTOLIC BLOOD PRESSURE: 136 MMHG | HEIGHT: 66 IN

## 2025-02-12 DIAGNOSIS — Z12.11 COLON CANCER SCREENING: ICD-10-CM

## 2025-02-12 DIAGNOSIS — Z85.038 PERSONAL HISTORY OF COLON CANCER: ICD-10-CM

## 2025-02-12 PROCEDURE — 99214 OFFICE O/P EST MOD 30 MIN: CPT

## 2025-02-12 RX ORDER — PREDNISONE 10 MG/1
TABLET ORAL ONCE A DAY
Refills: 0 | Status: ACTIVE | COMMUNITY
Start: 2022-04-14

## 2025-02-12 RX ORDER — SIMVASTATIN 20 MG/1
TABLET, FILM COATED ORAL
Refills: 0 | Status: ACTIVE | COMMUNITY
Start: 2020-01-08

## 2025-02-12 RX ORDER — METOPROLOL TARTRATE 100 MG/1
TABLET, FILM COATED ORAL ONCE A DAY
Refills: 0 | Status: ACTIVE | COMMUNITY
Start: 2022-04-14

## 2025-02-12 RX ORDER — INSULIN DETEMIR 100 [IU]/ML
INJECTION, SOLUTION SUBCUTANEOUS ONCE A DAY
Refills: 0 | Status: ACTIVE | COMMUNITY
Start: 2022-04-14

## 2025-02-12 RX ORDER — GLIPIZIDE 10 MG/1
TABLET ORAL TWICE A DAY
Refills: 0 | Status: ACTIVE | COMMUNITY
Start: 2020-01-08

## 2025-02-12 RX ORDER — SITAGLIPTIN PHOSPHATE 100 MG
TABLET ORAL
Refills: 0 | Status: ACTIVE | COMMUNITY
Start: 2020-01-08

## 2025-02-12 NOTE — HPI-TODAY'S VISIT:
77-year-old male presents today for annual follow-up. Personal history of colon cancer. Past surgical history of extended right hemicolectomy in May 2019 with Dr. Senior.  He presents to the office very upset that he has is being seen by an [facility].  We discussed canceling this appointment and following up with the MD.  However, we are unable to do scoping procedures for Mr. Engle due to his ESRD. His current dialysis schedule is Tuesdays, Thursdays, Saturdays. He understands we no longer have privileges at the hospital and his scoping must be done there.  From a lower GI perspective patient states he is having regular bowel movements. He denies dysphagia, dyspepsia, pyrosis, abdominal pain, change in bowel habits, unintentional weight loss, melena, or hematochezia.   Last colonoscopy 9/12/2023 - No obvious polyps seen with extensive lavage being performed - Somewhat suboptimal prep - Scope was slowly removed from the ileocolonic anastomosis to the rectum did not reveal any concerning lesions - Mild internal hemorrhoids - Repeat colonoscopy in 1 year  Last endoscopy 10/2/2020 - Z-line regular - Gastritis - Erosive gastropathy - Duodenitis

## 2025-02-20 ENCOUNTER — APPOINTMENT (OUTPATIENT)
Dept: URBAN - METROPOLITAN AREA CLINIC 335 | Facility: CLINIC | Age: 80
Setting detail: DERMATOLOGY
End: 2025-02-20

## 2025-02-20 DIAGNOSIS — L65.9 NONSCARRING HAIR LOSS, UNSPECIFIED: ICD-10-CM

## 2025-02-20 DIAGNOSIS — L21.8 OTHER SEBORRHEIC DERMATITIS: ICD-10-CM | Status: IMPROVED

## 2025-02-20 PROCEDURE — ? COUNSELING

## 2025-02-20 PROCEDURE — 99214 OFFICE O/P EST MOD 30 MIN: CPT

## 2025-02-20 PROCEDURE — ? PRESCRIPTION MEDICATION MANAGEMENT

## 2025-02-20 ASSESSMENT — LOCATION DETAILED DESCRIPTION DERM
LOCATION DETAILED: LEFT MEDIAL MALAR CHEEK
LOCATION DETAILED: INFERIOR MID FOREHEAD
LOCATION DETAILED: LEFT SUPERIOR PARIETAL SCALP
LOCATION DETAILED: SUPERIOR MID FOREHEAD

## 2025-02-20 ASSESSMENT — LOCATION SIMPLE DESCRIPTION DERM
LOCATION SIMPLE: LEFT CHEEK
LOCATION SIMPLE: SUPERIOR FOREHEAD
LOCATION SIMPLE: SCALP
LOCATION SIMPLE: INFERIOR FOREHEAD

## 2025-02-20 ASSESSMENT — LOCATION ZONE DERM
LOCATION ZONE: FACE
LOCATION ZONE: SCALP

## 2025-02-20 ASSESSMENT — SEVERITY ASSESSMENT: HOW SEVERE IS THIS PATIENT'S CONDITION?: ALMOST CLEAR

## 2025-04-09 ENCOUNTER — OFFICE VISIT (OUTPATIENT)
Dept: URBAN - METROPOLITAN AREA CLINIC 63 | Facility: CLINIC | Age: 80
End: 2025-04-09

## 2025-05-07 ENCOUNTER — APPOINTMENT (OUTPATIENT)
Dept: URBAN - METROPOLITAN AREA CLINIC 335 | Facility: CLINIC | Age: 80
Setting detail: DERMATOLOGY
End: 2025-05-07

## 2025-05-07 DIAGNOSIS — L65.9 NONSCARRING HAIR LOSS, UNSPECIFIED: ICD-10-CM

## 2025-05-07 DIAGNOSIS — L21.8 OTHER SEBORRHEIC DERMATITIS: ICD-10-CM | Status: IMPROVED

## 2025-05-07 PROCEDURE — 99214 OFFICE O/P EST MOD 30 MIN: CPT

## 2025-05-07 PROCEDURE — ? PRESCRIPTION MEDICATION MANAGEMENT

## 2025-05-07 PROCEDURE — ? COUNSELING

## 2025-05-07 ASSESSMENT — LOCATION ZONE DERM
LOCATION ZONE: FACE
LOCATION ZONE: SCALP

## 2025-05-07 ASSESSMENT — LOCATION DETAILED DESCRIPTION DERM
LOCATION DETAILED: LEFT SUPERIOR PARIETAL SCALP
LOCATION DETAILED: SUPERIOR MID FOREHEAD
LOCATION DETAILED: INFERIOR MID FOREHEAD
LOCATION DETAILED: LEFT MEDIAL MALAR CHEEK

## 2025-05-07 ASSESSMENT — LOCATION SIMPLE DESCRIPTION DERM
LOCATION SIMPLE: SCALP
LOCATION SIMPLE: INFERIOR FOREHEAD
LOCATION SIMPLE: LEFT CHEEK
LOCATION SIMPLE: SUPERIOR FOREHEAD

## 2025-07-30 ENCOUNTER — RX ONLY (RX ONLY)
Age: 80
End: 2025-07-30

## 2025-07-30 ENCOUNTER — APPOINTMENT (OUTPATIENT)
Dept: URBAN - METROPOLITAN AREA CLINIC 335 | Facility: CLINIC | Age: 80
Setting detail: DERMATOLOGY
End: 2025-07-30

## 2025-07-30 DIAGNOSIS — L65.9 NONSCARRING HAIR LOSS, UNSPECIFIED: ICD-10-CM

## 2025-07-30 DIAGNOSIS — L21.8 OTHER SEBORRHEIC DERMATITIS: ICD-10-CM

## 2025-07-30 PROCEDURE — ? COUNSELING

## 2025-07-30 PROCEDURE — ? PRESCRIPTION MEDICATION MANAGEMENT

## 2025-07-30 RX ORDER — KETOCONAZOLE 20 MG/G
CREAM TOPICAL BID
Qty: 30 | Refills: 1 | Status: ERX

## 2025-07-30 RX ORDER — FLUOCINOLONE ACETONIDE 0.11 MG/ML
OIL TOPICAL QD
Qty: 118.28 | Refills: 1 | Status: ERX

## 2025-07-30 RX ORDER — KETOCONAZOLE 20 MG/ML
SHAMPOO, SUSPENSION TOPICAL
Qty: 120 | Refills: 2 | Status: ERX

## 2025-07-30 ASSESSMENT — LOCATION SIMPLE DESCRIPTION DERM
LOCATION SIMPLE: LEFT CHEEK
LOCATION SIMPLE: SUPERIOR FOREHEAD
LOCATION SIMPLE: INFERIOR FOREHEAD
LOCATION SIMPLE: SCALP

## 2025-07-30 ASSESSMENT — LOCATION DETAILED DESCRIPTION DERM
LOCATION DETAILED: LEFT MEDIAL MALAR CHEEK
LOCATION DETAILED: SUPERIOR MID FOREHEAD
LOCATION DETAILED: LEFT SUPERIOR PARIETAL SCALP
LOCATION DETAILED: INFERIOR MID FOREHEAD

## 2025-07-30 ASSESSMENT — LOCATION ZONE DERM
LOCATION ZONE: FACE
LOCATION ZONE: SCALP